# Patient Record
Sex: MALE | Race: WHITE | NOT HISPANIC OR LATINO | Employment: FULL TIME | URBAN - NONMETROPOLITAN AREA
[De-identification: names, ages, dates, MRNs, and addresses within clinical notes are randomized per-mention and may not be internally consistent; named-entity substitution may affect disease eponyms.]

---

## 2023-10-10 ENCOUNTER — OFFICE VISIT (OUTPATIENT)
Dept: UROLOGY | Facility: CLINIC | Age: 58
End: 2023-10-10
Payer: COMMERCIAL

## 2023-10-10 ENCOUNTER — HOSPITAL ENCOUNTER (OUTPATIENT)
Dept: GENERAL RADIOLOGY | Facility: HOSPITAL | Age: 58
Discharge: HOME | End: 2023-10-10
Admitting: NURSE PRACTITIONER
Payer: COMMERCIAL

## 2023-10-10 VITALS
BODY MASS INDEX: 28.26 KG/M2 | DIASTOLIC BLOOD PRESSURE: 94 MMHG | WEIGHT: 197.4 LBS | HEART RATE: 98 BPM | SYSTOLIC BLOOD PRESSURE: 140 MMHG | HEIGHT: 70 IN

## 2023-10-10 DIAGNOSIS — R31.29 OTHER MICROSCOPIC HEMATURIA: ICD-10-CM

## 2023-10-10 DIAGNOSIS — R31.0 GROSS HEMATURIA: Primary | ICD-10-CM

## 2023-10-10 DIAGNOSIS — N40.0 BPH WITHOUT OBSTRUCTION/LOWER URINARY TRACT SYMPTOMS: ICD-10-CM

## 2023-10-10 DIAGNOSIS — N20.0 BILATERAL RENAL STONES: ICD-10-CM

## 2023-10-10 DIAGNOSIS — K58.1 IRRITABLE BOWEL SYNDROME WITH CONSTIPATION: ICD-10-CM

## 2023-10-10 DIAGNOSIS — Z87.442 HISTORY OF KIDNEY STONES: ICD-10-CM

## 2023-10-10 LAB
BILIRUB BLD-MCNC: ABNORMAL MG/DL
CLARITY, POC: CLEAR
COLOR UR: YELLOW
EXPIRATION DATE: ABNORMAL
GLUCOSE UR STRIP-MCNC: NEGATIVE MG/DL
KETONES UR QL: NEGATIVE
LEUKOCYTE EST, POC: ABNORMAL
Lab: ABNORMAL
NITRITE UR-MCNC: NEGATIVE MG/ML
PH UR: 5.5 [PH] (ref 5–8)
PROT UR STRIP-MCNC: ABNORMAL MG/DL
RBC # UR STRIP: ABNORMAL /UL
SP GR UR: 1.02 (ref 1–1.03)
UROBILINOGEN UR QL: NORMAL

## 2023-10-10 PROCEDURE — 99204 OFFICE O/P NEW MOD 45 MIN: CPT | Performed by: NURSE PRACTITIONER

## 2023-10-10 PROCEDURE — 87086 URINE CULTURE/COLONY COUNT: CPT | Performed by: NURSE PRACTITIONER

## 2023-10-10 PROCEDURE — 74018 RADEX ABDOMEN 1 VIEW: CPT

## 2023-10-10 PROCEDURE — 81003 URINALYSIS AUTO W/O SCOPE: CPT | Performed by: NURSE PRACTITIONER

## 2023-10-10 RX ORDER — VALSARTAN 160 MG/1
160 TABLET ORAL DAILY
COMMUNITY
Start: 2023-09-19

## 2023-10-10 RX ORDER — TAMSULOSIN HYDROCHLORIDE 0.4 MG/1
1 CAPSULE ORAL DAILY
Qty: 30 CAPSULE | Refills: 5 | Status: SHIPPED | OUTPATIENT
Start: 2023-10-10

## 2023-10-10 RX ORDER — SENNOSIDES A AND B 8.6 MG/1
2 TABLET, FILM COATED ORAL DAILY PRN
Qty: 30 TABLET | Refills: 3 | Status: SHIPPED | OUTPATIENT
Start: 2023-10-10

## 2023-10-10 RX ORDER — TAMSULOSIN HYDROCHLORIDE 0.4 MG/1
1 CAPSULE ORAL DAILY
Qty: 30 CAPSULE | Refills: 5 | Status: SHIPPED | OUTPATIENT
Start: 2023-10-10 | End: 2023-10-10 | Stop reason: SDUPTHER

## 2023-10-10 RX ORDER — POLYETHYLENE GLYCOL 3350 17 G/17G
17 POWDER, FOR SOLUTION ORAL DAILY
Qty: 30 EACH | Refills: 3 | Status: SHIPPED | OUTPATIENT
Start: 2023-10-10

## 2023-10-10 RX ORDER — ATORVASTATIN CALCIUM 40 MG/1
40 TABLET, FILM COATED ORAL DAILY
COMMUNITY
Start: 2023-09-19

## 2023-10-10 NOTE — PROGRESS NOTES
Chief Complaint  Blood in Urine (NEW PATIENT WITH GROSS HEMATURIA/BPH /BILATERAL KIDNEY STONES)    Subjective          Sudhakar Caputo presents to [facility] for BPH WITH LUTS  Blood in Urine  Irritative symptoms do not include frequency or urgency. Pertinent negatives include no abdominal pain, chills, dysuria, fever, flank pain, nausea, vomiting or weight loss.     Mr. Sudhakar Caputo is a pleasant 58-year-old male who presents to clinic today for evaluation as a new patient consult. Patient has been referred to clinic by PCP with concerns of gross hematuria. On initial evaluation in clinic today, he is no apparent discomfort. 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  He has a motorized lawnmower, he reports he only sees blood after he mows his lawn which is kind of in a steep slope. He however reports numerous other concerns including BPH with lower urinary tract symptoms complicated by urinary hesitancy, with constant feeling of incomplete bladder emptying. Patient reports a history of kidney stones. On initial evaluation in clinic today, his urinalysis does show trace leukocyte esterase. It is negative for nitrite. It is negative for gross but showed trace microscopic hematuria. IPSS score is 10 with a PVR of 0 mL.  He is unsure about any PSA results.    The patient states he noticed hematuria approximately 6 weeks ago after mowing. About a week later he again had hematuria after mowing. Approximately 2 weeks later after mowing he had increased hematuria. Each time, after urinating once or twice, the gross hematuria has resolved. Initially, the blood is bright red but does not contain clots. The last time he urinated it burned. He denies any urinary frequency. His stream is between strong and weak. When he drinks enough water, his stream is stronger. His bladder does not feel like it empties after urinating. He denies nocturia.    The  "patient has a history of nephrolithiasis. His last one was in 12/2022 and was approximately his fifth. He was at [facility] at the time. His last imaging was performed at that then. They did not mention any additional nephrolithiasis seen in imaging.  Today, he denies any lower back pain or abdominal pain.  He denies flank pain, he does not have any CVA tenderness.  He states Dr. Savage checked his prostate and it was normal. The patient is unaware of what his PSA values have been.    We both reviewed/discussed HIS KUB results today which are unremarkable with no dilation.  However shows bilateral nonobstructive stones with no stones noted in ureters.  GASTROINTESTINAL TRACT: Abundant /LARGE colonic stool BURDEN.  This is characterized by extreme amounts of constipation.  We spoke about the impact of this on bladder function.  We spoke about  its relationship to recurrent urinary tract infections, hematuria, abdominal pain, pelvic/bladder pressure, back pain, flank pain he is experiencing.     He denies any family history of bladder cancer, prostate cancer, or breast cancer.     He states he smokes cigars.    FINDINGS KUB 10/10/2023: 2 views of the abdomen Large stool burden   Calcifications projecting over the left kidney. No evidence of bowel obstruction    Active Ambulatory Problems     Diagnosis Date Noted    Gross hematuria 10/10/2023    Other microscopic hematuria 10/10/2023    History of kidney stones 10/10/2023    Bilateral renal stones 10/10/2023    BPH without obstruction/lower urinary tract symptoms 10/10/2023    Irritable bowel syndrome with constipation 10/10/2023     Resolved Ambulatory Problems     Diagnosis Date Noted    No Resolved Ambulatory Problems     No Additional Past Medical History      Objective   Vital Signs:   /94   Pulse 98   Ht 177.8 cm (70\")   Wt 89.5 kg (197 lb 6.4 oz)   BMI 28.32 kg/mý       ROS:   Review of Systems   Constitutional:  Positive for activity change and " appetite change. Negative for chills, diaphoresis, fatigue, fever, unexpected weight gain and unexpected weight loss.   HENT: Negative.  Negative for congestion and sinus pressure.    Eyes: Negative.  Negative for blurred vision, double vision, photophobia, pain, redness and visual disturbance.   Respiratory: Negative.  Negative for apnea, cough, chest tightness, shortness of breath and wheezing.    Cardiovascular: Negative.    Gastrointestinal: Negative.  Negative for abdominal distention, abdominal pain, constipation, diarrhea, nausea and vomiting.   Endocrine: Negative.  Negative for polydipsia, polyphagia and polyuria.   Genitourinary:  Positive for hematuria. Negative for decreased urine volume, difficulty urinating, discharge, dysuria, flank pain, frequency, genital sores, penile pain, penile swelling, scrotal swelling, testicular pain, urgency and urinary incontinence.   Musculoskeletal: Negative.  Negative for back pain.   Skin:  Positive for color change and dry skin. Negative for pallor, rash and wound.   Allergic/Immunologic: Negative.    Neurological: Negative.  Negative for dizziness, weakness, headache, memory problem and confusion.   Hematological: Negative.  Does not bruise/bleed easily.   Psychiatric/Behavioral:  Positive for sleep disturbance and stress. Negative for agitation, behavioral problems and decreased concentration. The patient is not nervous/anxious.         Physical Exam  Constitutional:       General: He is in acute distress.      Appearance: He is well-developed.   HENT:      Head: Normocephalic and atraumatic.      Right Ear: External ear normal.      Left Ear: External ear normal.   Eyes:      General:         Right eye: No discharge.         Left eye: No discharge.      Conjunctiva/sclera: Conjunctivae normal.      Pupils: Pupils are equal, round, and reactive to light.   Neck:      Thyroid: No thyromegaly.      Trachea: No tracheal deviation.   Cardiovascular:      Rate and  Rhythm: Normal rate and regular rhythm.      Heart sounds: No murmur heard.     No friction rub.   Pulmonary:      Effort: Pulmonary effort is normal. No respiratory distress.      Breath sounds: Normal breath sounds. No stridor.   Abdominal:      General: Bowel sounds are normal. There is distension.      Palpations: Abdomen is soft.      Tenderness: There is abdominal tenderness. There is no guarding.   Genitourinary:     Penis: Normal and uncircumcised. No tenderness or discharge.       Testes: Normal.      Rectum: Normal. Guaiac result negative.      Comments: GROSS HEMATURIA, MARILEE-DEFERRED PER PTS REQUEST  Musculoskeletal:         General: Tenderness present. No deformity. Normal range of motion.      Cervical back: Normal range of motion and neck supple.   Skin:     General: Skin is warm and dry.      Capillary Refill: Capillary refill takes less than 2 seconds.      Coloration: Skin is pale.   Neurological:      Mental Status: He is alert and oriented to person, place, and time.      Cranial Nerves: No cranial nerve deficit.      Motor: Weakness present.      Coordination: Coordination normal.   Psychiatric:         Behavior: Behavior normal.         Thought Content: Thought content normal.         Judgment: Judgment normal.        Result Review :     UA          10/10/2023    08:53   Urinalysis   Ketones, UA Negative    Leukocytes, UA Trace      Urine Culture          10/10/2023    08:53   Urine Culture   Urine Culture No growth             Assessment and Plan    Problem List Items Addressed This Visit       Gross hematuria - Primary  Unremarkable RPE.   If KUB negative, proceed with CT for definitive plan of care.  Schedule patient for cystoscopy procedure with Dr. Frances pending results.     Relevant Orders    XR abdomen kub (Completed)    Other microscopic hematuria  Unremarkable RPE.   If KUB negative, proceed with CT for definitive plan of care.  Schedule patient for cystoscopy procedure with   Juan Daniel pending results.     Relevant Medications    tamsulosin (FLOMAX) 0.4 MG capsule 24 hr capsule    Other Relevant Orders    POC Urinalysis Dipstick, Automated (Completed)    Urine Culture - Urine, Urine, Clean Catch    XR abdomen kub (Completed)    History of kidney stones  Unremarkable RPE.   If KUB negative, proceed with CT for definitive plan of care.  Schedule patient for cystoscopy procedure with Dr. Frances pending results.     Relevant Medications    tamsulosin (FLOMAX) 0.4 MG capsule 24 hr capsule    Other Relevant Orders    XR abdomen kub (Completed)    Bilateral renal stones    BPH without obstruction/lower urinary tract symptoms  Unremarkable RPE.   If KUB negative, proceed with CT for definitive plan of care.  Schedule patient for cystoscopy procedure with Dr. Frances pending results.     Relevant Medications    tamsulosin (FLOMAX) 0.4 MG capsule 24 hr capsule    Other Relevant Orders    XR abdomen kub (Completed)    Irritable bowel syndrome with constipation    Relevant Medications    polyethylene glycol (MiraLax) 17 g packet    senna (SENOKOT) 8.6 MG tablet    tamsulosin (FLOMAX) 0.4 MG capsule 24 hr capsule                ASSESSMENT        GROSS HEMATURIA BILATERAL Nephrolithiasis/ L>R FLANK PAIN /IBS -C  Mr. Sudhakar Caputo is a pleasant 58-year-old male with significant history of recurrent kidney stones, last kidney stone procedure in December 2022, BPH with LUTS, who presents to clinic today for evaluation with concerns of persistent hematuria ongoing for about 6 weeks.  Patient reports his episodes are intermittent most often consistent post riding  HIS lawnmower.  He describes his episodes as bright red blood, he denies any clots.  He reports dysuria, burning with urination at times, but denies episodes of frequency, urgency.  He denies urinary hesitancy or postvoid dribbling.  Reports his stream is intermittent, denies nocturia.  Urinalysis however showed trace leukocyte esterase, it is  negative for nitrites, it is negative for gross but show trace microscopic hematuria.  PVR is 0 mL, with an IPSS score of 0. HE HAD an unremarkable digital rectal exam-MARILEE by his PCP.  His most recent recent PSA is 0.09 done on 9/19 or 2023.  He denies any prior issues with prostatitis.    GROSS HEMATURIA: first we Discussed Microscopic VS Gross Hematuria with patient. He has been very symptomatic and has some gross hematuria LAST WEEK.  We discussed the possible causes such as infection in the bladder, kidney, or bladder discomfort, trauma, kidney stones, different kinds of cancers, vigorous exercise, viral illness, such as hepatitis a virus that causes liver disease and inflammation of the liver and sexual activity.    WE Discussed the fact that there is about a 96% chance of a negative workup with episodes of microscopic hematuria and with much greater in the face of Gross hematuria.  We discussed the fact that this is a non-cumulative test.  In other words if there is hematuria next year I would recommend continuing to work up the condition because of the fact that neoplasms may be small at the first workup and easily are missed.   We discussed the fact that if there is any history of chronic kidney disease or risk factors such as diabetes for contrast a noncontrasted study will be utilized.  We will initiate an   investigation.       BILATERAL Nephrolithiasis/ L>R FLANK PAIN : KUB today is remarkable for bilateral nonobstructing stones, largest being 8 mm on the left. TODAY, We discussed the presence of the bilateral nonobstructing stones. We have discussed the various parameters regarding spontaneous passage including the notion that a tiny 1-4 mm stone has a 95% high likelihood of spontaneous passage versus a larger stone of >8 mm like he has being caught up in the upper areas of the urinary tract. We also discussed the medical management of stone disease and the use of medical expulsive therapy in the form  of Flomax. This is used in an off label setting. I also talked about nonoperative management including ambulation and increasing fluids and hot tub as being an effective adjuncts in the treatment of a stone.    We discussed the presence of the stones. We discussed the various therapeutic options available including percutaneous nephrostolithotomy, lithotripsy,ESWL.  We discussed the risks of lithotripsy including the passage of stones,  the development of a large string of stones in the distal ureter known as Steinstrasse.  In the 3% incidence of that we will need to proceed with a ureteroscopy for obstructing fragments.  Extremely rare incidence of renal hematoma, and the significance of this. We discussed the absolute relative indicators for intervention including the presence of sepsis, and pain we cannot control as the primary need for urgent intervention.  We discussed placement of a stent if indicated and the management of the stent as well.      ESWL-the patient is a candidate for extracorporeal shockwave lithotripsy.  We discussed the type of stone.  And the complications associated with the procedure including but not limited to pain in the flank, hematoma, spontaneous renal hemorrhage, and adequate fragmentation of stones.  The need for passage of the stones.  The need for concomitant additional procedures in the range of 24% the risk of a distal fragment in the range of 3% requiring ureteroscopic.  Patient reports He is not desirous of any surgical intervention at this time.  We will continue to monitor       PLAN  Start tamsulosin 0.4 mg daily-BPH with LUTS/bilateral kidney stones    We will resend his urine for culture, due to dysuria, frequency, I will call him with results if any positive bacterial growth.    We discussed starting antibiotic therapy with Bactrim, if any positive bacteria     I recommend concomitant probiotics with treatment with antibiotics to protect the rectal reservoir including  over-the-counter yogurt preparations to karen oral pills containing the appropriate probiotics.     We discussed the use of both an upper and lower tract investigation.  I discussed the fact that an upper tract investigation includes a  CT scan with contrast being the gold standard to diagnose the small neoplasms-patient had prior CT in December-pending results.  Repeat KUB today is remarkable for bilateral nonobstructing stones.  Most likely the source of his hematuria.     We discussed other treatment options for HIS BACK/FLANK pain with patient encouraged to continue conservative therapy alternating NSAID/Tylenol as tolerated, Also including hot baths, showers, warm compresses to lower back as tolerated. Also encouraged walking, physical therapy, light exercises as tolerated    Rest is the most important treatment in the case of BACK/FLANK pain. Rest and physical therapy are enough to improve minor pain. Discussed to monitor his daily routine with prevention of flank pain by: At least Drinking 8 glasses of water per day, Limiting your alcohol consumption.  Having a healthy diet containing fruits, vegetables, and a lot of fluids, Practicing safe sex.  Also maintaining proper hygiene of your body as well as the environment.    Discussed a kidney stone prevention diet to include increasing p.o. fluid intake, to at least 1 to 2 L of water daily.  She is to avoid caffeine products such as cola, coffee, and to avoid soft or soda drinks.  She is to decrease her sodium consumption as in  Fast foods, cherry, salted nuts, canned foods, and smoked/cured foods. She is also to decrease her oxalate consumption, as in spinach, Sesar greens, and Rhubarb.  Also important is to decrease protein intake, as in red meats, peanut butter, and also avoid nuts.    We discussed  lower tract investigation consisting of a cystoscopy due to positive history of smoking x20+ years if hematuria persists-deferred per patient.    We Discussed  obtaining a CT urogram if his Gross / Microhematuria  Persists.     If the patient's hematuria work-up is negative, per AUA guidelines I would recommend a repeat urinalysis via the patient's primary care provider in 12 months.  If the repeat urinalysis is positive, the patient and I will then need to have a shared decision-making conversation regarding further work-up versus observation, given the low likelihood of identifying etiology in this situation.  If patient were to develop gross hematuria in the interim, the patient would need a total re-evaluation.                                     He will follow-up in clinic in 1 year,    He may return sooner if hematuria episodes worsen for kidney stone therapy.    Patient is agreeable plan of care.    Patient reports that he is not currently experiencing any symptoms of urinary incontinence.    BMI is >= 25 and <30. (Overweight) The following options were offered after discussion;: weight loss educational material (shared in after visit summary), exercise counseling/recommendations, and nutrition counseling/recommendations      RADIOLOGY (CT AND/OR KUB):    CT Abdomen and Pelvis: No results found for this or any previous visit.     CT Stone Protocol: No results found for this or any previous visit.     KUB: No results found for this or any previous visit.       LABS (3 MONTHS):    Office Visit on 10/10/2023   Component Date Value Ref Range Status    Color 10/10/2023 Yellow  Yellow, Straw, Dark Yellow, Ivy Final    Clarity, UA 10/10/2023 Clear  Clear Final    Specific Gravity  10/10/2023 1.025  1.005 - 1.030 Final    pH, Urine 10/10/2023 5.5  5.0 - 8.0 Final    Leukocytes 10/10/2023 Trace (A)  Negative Final    Nitrite, UA 10/10/2023 Negative  Negative Final    Protein, POC 10/10/2023 1+ (A)  Negative mg/dL Final    Glucose, UA 10/10/2023 Negative  Negative mg/dL Final    Ketones, UA 10/10/2023 Negative  Negative Final    Urobilinogen, UA 10/10/2023 Normal  Normal,  0.2 E.U./dL Final    Bilirubin 10/10/2023 Small (1+) (A)  Negative Final    Blood, UA 10/10/2023 Trace (A)  Negative Final    Lot Number 10/10/2023 [id number]   Final    Expiration Date 10/10/2023 10/25/2024   Final    Urine Culture 10/10/2023 No growth   Final        IPSS Questionnaire (AUA-7):  Over the past month.    1)  How often have you had a sensation of not emptying your bladder completely after you finish urinating?  0 - Not at all   2)  How often have you had to urinate again less than two hours after you finished urinating? 0 - Not at all   3)  How often have you found you stopped and started again several times when you urinated?  0 - Not at all   4) How difficult have you found it to postpone urination?  0 - Not at all   5) How often have you had a weak urinary stream?  0 - Not at all   6) How often have you had to push or strain to begin urination?  0 - Not at all   7) How many times did you most typically get up to urinate from the time you went to bed until the time you got up in the morning?  0 - None   Total Score:  0     Smoking Cessation Counseling:  Current some day smoker. less than 3 minutes spent counseling. Will try to cut down.  I advised patient to quit tobacco use and offered support.  I provided patient with tobacco cessation educational material printed in the patient's After Visit Summary.     Follow Up   Return in about 1 year (around 10/10/2024) for Next scheduled follow up, GROSS/MICRO HEMATURIA/RENAL STONES/, FOR  BPH W LUTS/PSA/IBS-C.    Patient was given instructions and counseling regarding his condition or for health maintenance advice. Please see specific information pulled into the AVS if appropriate.          This document has been electronically signed by   October 11, 2023 17:51 EDT      Dictated Utilizing Dragon Dictation: Part of this note may be an electronic transcription/translation of spoken language to printed text using the Dragon Dictation  System.      Transcribed from ambient dictation for Griselda Cheng-Akwa, APRN by Lacy Espinoza.  10/11/23   09:11 EDT    Patient or patient representative verbalized consent to the visit recording.  I have personally performed the services described in this document as transcribed by the above individual, and it is both accurate and complete.

## 2023-10-11 LAB — BACTERIA SPEC AEROBE CULT: NO GROWTH

## 2023-10-25 DIAGNOSIS — N20.0 LEFT RENAL STONE: ICD-10-CM

## 2023-10-25 DIAGNOSIS — R10.9 BILATERAL FLANK PAIN: ICD-10-CM

## 2023-10-25 DIAGNOSIS — R31.0 GROSS HEMATURIA: ICD-10-CM

## 2023-10-25 DIAGNOSIS — N20.0 BILATERAL RENAL STONES: Primary | ICD-10-CM

## 2023-10-25 RX ORDER — GENTAMICIN SULFATE 80 MG/100ML
80 INJECTION, SOLUTION INTRAVENOUS ONCE
OUTPATIENT
Start: 2023-10-25

## 2023-10-26 PROBLEM — N20.0 LEFT RENAL STONE: Status: ACTIVE | Noted: 2023-10-25

## 2023-10-26 PROBLEM — R10.9 BILATERAL FLANK PAIN: Status: ACTIVE | Noted: 2023-10-25

## 2023-11-01 DIAGNOSIS — N20.0 BILATERAL RENAL STONES: Primary | ICD-10-CM

## 2023-11-10 ENCOUNTER — HOSPITAL ENCOUNTER (OUTPATIENT)
Facility: HOSPITAL | Age: 58
Setting detail: HOSPITAL OUTPATIENT SURGERY
Discharge: HOME | End: 2023-11-10
Attending: UROLOGY | Admitting: UROLOGY
Payer: COMMERCIAL

## 2023-11-10 ENCOUNTER — ANESTHESIA (OUTPATIENT)
Dept: PERIOP | Facility: HOSPITAL | Age: 58
End: 2023-11-10
Payer: COMMERCIAL

## 2023-11-10 ENCOUNTER — ANESTHESIA EVENT (OUTPATIENT)
Dept: PERIOP | Facility: HOSPITAL | Age: 58
End: 2023-11-10
Payer: COMMERCIAL

## 2023-11-10 VITALS
WEIGHT: 198.8 LBS | HEIGHT: 70 IN | OXYGEN SATURATION: 94 % | HEART RATE: 62 BPM | SYSTOLIC BLOOD PRESSURE: 143 MMHG | BODY MASS INDEX: 28.46 KG/M2 | TEMPERATURE: 97.3 F | DIASTOLIC BLOOD PRESSURE: 90 MMHG | RESPIRATION RATE: 18 BRPM

## 2023-11-10 DIAGNOSIS — N20.0 LEFT RENAL STONE: ICD-10-CM

## 2023-11-10 DIAGNOSIS — R31.0 GROSS HEMATURIA: ICD-10-CM

## 2023-11-10 DIAGNOSIS — R10.9 BILATERAL FLANK PAIN: ICD-10-CM

## 2023-11-10 DIAGNOSIS — N20.0 BILATERAL RENAL STONES: ICD-10-CM

## 2023-11-10 PROCEDURE — 25010000002 GENTAMICIN PER 80 MG: Performed by: NURSE PRACTITIONER

## 2023-11-10 PROCEDURE — 25810000003 LACTATED RINGERS PER 1000 ML: Performed by: NURSE ANESTHETIST, CERTIFIED REGISTERED

## 2023-11-10 PROCEDURE — 25010000002 FENTANYL CITRATE (PF) 50 MCG/ML SOLUTION: Performed by: NURSE ANESTHETIST, CERTIFIED REGISTERED

## 2023-11-10 PROCEDURE — S0260 H&P FOR SURGERY: HCPCS | Performed by: UROLOGY

## 2023-11-10 PROCEDURE — 25010000002 PROPOFOL 200 MG/20ML EMULSION: Performed by: NURSE ANESTHETIST, CERTIFIED REGISTERED

## 2023-11-10 PROCEDURE — 25010000002 MIDAZOLAM PER 1 MG: Performed by: NURSE ANESTHETIST, CERTIFIED REGISTERED

## 2023-11-10 PROCEDURE — 25010000002 ONDANSETRON PER 1 MG: Performed by: NURSE ANESTHETIST, CERTIFIED REGISTERED

## 2023-11-10 PROCEDURE — 25810000003 LACTATED RINGERS PER 1000 ML: Performed by: ANESTHESIOLOGY

## 2023-11-10 PROCEDURE — 50590 FRAGMENTING OF KIDNEY STONE: CPT | Performed by: UROLOGY

## 2023-11-10 RX ORDER — SODIUM CHLORIDE, SODIUM LACTATE, POTASSIUM CHLORIDE, CALCIUM CHLORIDE 600; 310; 30; 20 MG/100ML; MG/100ML; MG/100ML; MG/100ML
100 INJECTION, SOLUTION INTRAVENOUS ONCE AS NEEDED
Status: DISCONTINUED | OUTPATIENT
Start: 2023-11-10 | End: 2023-11-10 | Stop reason: HOSPADM

## 2023-11-10 RX ORDER — HYDROCODONE BITARTRATE AND ACETAMINOPHEN 10; 325 MG/1; MG/1
1 TABLET ORAL EVERY 6 HOURS PRN
Qty: 12 TABLET | Refills: 0 | Status: SHIPPED | OUTPATIENT
Start: 2023-11-10

## 2023-11-10 RX ORDER — FENTANYL CITRATE 50 UG/ML
50 INJECTION, SOLUTION INTRAMUSCULAR; INTRAVENOUS
Status: DISCONTINUED | OUTPATIENT
Start: 2023-11-10 | End: 2023-11-10 | Stop reason: HOSPADM

## 2023-11-10 RX ORDER — MIDAZOLAM HYDROCHLORIDE 1 MG/ML
1 INJECTION INTRAMUSCULAR; INTRAVENOUS
Status: DISCONTINUED | OUTPATIENT
Start: 2023-11-10 | End: 2023-11-10 | Stop reason: HOSPADM

## 2023-11-10 RX ORDER — SODIUM CHLORIDE 9 MG/ML
40 INJECTION, SOLUTION INTRAVENOUS AS NEEDED
Status: DISCONTINUED | OUTPATIENT
Start: 2023-11-10 | End: 2023-11-10 | Stop reason: HOSPADM

## 2023-11-10 RX ORDER — SODIUM CHLORIDE 0.9 % (FLUSH) 0.9 %
10 SYRINGE (ML) INJECTION EVERY 12 HOURS SCHEDULED
Status: DISCONTINUED | OUTPATIENT
Start: 2023-11-10 | End: 2023-11-10 | Stop reason: HOSPADM

## 2023-11-10 RX ORDER — SODIUM CHLORIDE, SODIUM LACTATE, POTASSIUM CHLORIDE, CALCIUM CHLORIDE 600; 310; 30; 20 MG/100ML; MG/100ML; MG/100ML; MG/100ML
125 INJECTION, SOLUTION INTRAVENOUS ONCE
Status: COMPLETED | OUTPATIENT
Start: 2023-11-10 | End: 2023-11-10

## 2023-11-10 RX ORDER — LIDOCAINE HYDROCHLORIDE 20 MG/ML
INJECTION, SOLUTION EPIDURAL; INFILTRATION; INTRACAUDAL; PERINEURAL AS NEEDED
Status: DISCONTINUED | OUTPATIENT
Start: 2023-11-10 | End: 2023-11-10 | Stop reason: SURG

## 2023-11-10 RX ORDER — ONDANSETRON 2 MG/ML
4 INJECTION INTRAMUSCULAR; INTRAVENOUS AS NEEDED
Status: DISCONTINUED | OUTPATIENT
Start: 2023-11-10 | End: 2023-11-10 | Stop reason: HOSPADM

## 2023-11-10 RX ORDER — MIDAZOLAM HYDROCHLORIDE 1 MG/ML
INJECTION INTRAMUSCULAR; INTRAVENOUS AS NEEDED
Status: DISCONTINUED | OUTPATIENT
Start: 2023-11-10 | End: 2023-11-10 | Stop reason: SURG

## 2023-11-10 RX ORDER — IPRATROPIUM BROMIDE AND ALBUTEROL SULFATE 2.5; .5 MG/3ML; MG/3ML
3 SOLUTION RESPIRATORY (INHALATION) ONCE AS NEEDED
Status: DISCONTINUED | OUTPATIENT
Start: 2023-11-10 | End: 2023-11-10 | Stop reason: HOSPADM

## 2023-11-10 RX ORDER — SODIUM CHLORIDE 0.9 % (FLUSH) 0.9 %
10 SYRINGE (ML) INJECTION AS NEEDED
Status: DISCONTINUED | OUTPATIENT
Start: 2023-11-10 | End: 2023-11-10 | Stop reason: HOSPADM

## 2023-11-10 RX ORDER — PROPOFOL 10 MG/ML
INJECTION, EMULSION INTRAVENOUS AS NEEDED
Status: DISCONTINUED | OUTPATIENT
Start: 2023-11-10 | End: 2023-11-10 | Stop reason: SURG

## 2023-11-10 RX ORDER — FAMOTIDINE 10 MG/ML
INJECTION, SOLUTION INTRAVENOUS AS NEEDED
Status: DISCONTINUED | OUTPATIENT
Start: 2023-11-10 | End: 2023-11-10 | Stop reason: SURG

## 2023-11-10 RX ORDER — OXYCODONE HYDROCHLORIDE AND ACETAMINOPHEN 5; 325 MG/1; MG/1
1 TABLET ORAL ONCE AS NEEDED
Status: DISCONTINUED | OUTPATIENT
Start: 2023-11-10 | End: 2023-11-10 | Stop reason: HOSPADM

## 2023-11-10 RX ORDER — FENTANYL CITRATE 50 UG/ML
INJECTION, SOLUTION INTRAMUSCULAR; INTRAVENOUS AS NEEDED
Status: DISCONTINUED | OUTPATIENT
Start: 2023-11-10 | End: 2023-11-10 | Stop reason: SURG

## 2023-11-10 RX ORDER — MEPERIDINE HYDROCHLORIDE 25 MG/ML
12.5 INJECTION INTRAMUSCULAR; INTRAVENOUS; SUBCUTANEOUS
Status: DISCONTINUED | OUTPATIENT
Start: 2023-11-10 | End: 2023-11-10 | Stop reason: HOSPADM

## 2023-11-10 RX ORDER — GENTAMICIN SULFATE 80 MG/100ML
80 INJECTION, SOLUTION INTRAVENOUS ONCE
Status: COMPLETED | OUTPATIENT
Start: 2023-11-10 | End: 2023-11-10

## 2023-11-10 RX ORDER — EPHEDRINE SULFATE 5 MG/ML
INJECTION INTRAVENOUS AS NEEDED
Status: DISCONTINUED | OUTPATIENT
Start: 2023-11-10 | End: 2023-11-10 | Stop reason: SURG

## 2023-11-10 RX ORDER — SODIUM CHLORIDE, SODIUM LACTATE, POTASSIUM CHLORIDE, CALCIUM CHLORIDE 600; 310; 30; 20 MG/100ML; MG/100ML; MG/100ML; MG/100ML
INJECTION, SOLUTION INTRAVENOUS CONTINUOUS PRN
Status: DISCONTINUED | OUTPATIENT
Start: 2023-11-10 | End: 2023-11-10 | Stop reason: SURG

## 2023-11-10 RX ORDER — ONDANSETRON 2 MG/ML
INJECTION INTRAMUSCULAR; INTRAVENOUS AS NEEDED
Status: DISCONTINUED | OUTPATIENT
Start: 2023-11-10 | End: 2023-11-10 | Stop reason: SURG

## 2023-11-10 RX ORDER — KETOROLAC TROMETHAMINE 30 MG/ML
30 INJECTION, SOLUTION INTRAMUSCULAR; INTRAVENOUS EVERY 6 HOURS PRN
Status: DISCONTINUED | OUTPATIENT
Start: 2023-11-10 | End: 2023-11-10 | Stop reason: HOSPADM

## 2023-11-10 RX ADMIN — GENTAMICIN SULFATE 80 MG: 80 INJECTION, SOLUTION INTRAVENOUS at 12:29

## 2023-11-10 RX ADMIN — EPHEDRINE SULFATE 10 MG: 5 INJECTION INTRAVENOUS at 12:38

## 2023-11-10 RX ADMIN — EPHEDRINE SULFATE 10 MG: 5 INJECTION INTRAVENOUS at 12:36

## 2023-11-10 RX ADMIN — FAMOTIDINE 20 MG: 10 INJECTION, SOLUTION INTRAVENOUS at 12:29

## 2023-11-10 RX ADMIN — SODIUM CHLORIDE, POTASSIUM CHLORIDE, SODIUM LACTATE AND CALCIUM CHLORIDE: 600; 310; 30; 20 INJECTION, SOLUTION INTRAVENOUS at 12:29

## 2023-11-10 RX ADMIN — ONDANSETRON 4 MG: 2 INJECTION INTRAMUSCULAR; INTRAVENOUS at 12:37

## 2023-11-10 RX ADMIN — SODIUM CHLORIDE, POTASSIUM CHLORIDE, SODIUM LACTATE AND CALCIUM CHLORIDE 125 ML/HR: 600; 310; 30; 20 INJECTION, SOLUTION INTRAVENOUS at 11:16

## 2023-11-10 RX ADMIN — FENTANYL CITRATE 100 MCG: 50 INJECTION, SOLUTION INTRAMUSCULAR; INTRAVENOUS at 12:29

## 2023-11-10 RX ADMIN — LIDOCAINE HYDROCHLORIDE 60 MG: 20 INJECTION, SOLUTION EPIDURAL; INFILTRATION; INTRACAUDAL; PERINEURAL at 12:32

## 2023-11-10 RX ADMIN — MIDAZOLAM HYDROCHLORIDE 2 MG: 1 INJECTION, SOLUTION INTRAMUSCULAR; INTRAVENOUS at 12:29

## 2023-11-10 RX ADMIN — PROPOFOL 100 MG: 10 INJECTION, EMULSION INTRAVENOUS at 12:32

## 2023-11-10 RX ADMIN — EPHEDRINE SULFATE 10 MG: 5 INJECTION INTRAVENOUS at 12:34

## 2023-11-10 NOTE — ANESTHESIA PROCEDURE NOTES
Airway  Urgency: elective    Date/Time: 11/10/2023 12:32 PM  Airway not difficult    General Information and Staff    Patient location during procedure: OR  Anesthesiologist: Uche Lang DO  CRNA/CAA: Sue Garrison CRNA    Indications and Patient Condition  Indications for airway management: airway protection    Preoxygenated: yes  Mask difficulty assessment: 0 - not attempted    Final Airway Details  Final airway type: supraglottic airway      Successful airway: classic  Size 4     Number of attempts at approach: 1  Assessment: lips, teeth, and gum same as pre-op    Additional Comments  LMA placed with no trauma noted. Patient tolerated well. Good seal. Secured.

## 2023-11-10 NOTE — ANESTHESIA POSTPROCEDURE EVALUATION
Patient: Sudhakar Caputo    Procedure Summary       Date: 11/10/23 Room / Location:  [facility] OR 09 /  [facility] OR    Anesthesia Start: 1229 Anesthesia Stop: 1305    Procedure: EXTRACORPOREAL SHOCKWAVE LITHOTRIPSY (Left) Diagnosis:       Bilateral renal stones      Left renal stone      Bilateral flank pain      Gross hematuria      (Bilateral renal stones [N20.0])      (Left renal stone [N20.0])      (Bilateral flank pain [R10.9])      (Gross hematuria [R31.0])    Surgeons: Ramiro Frances MD Provider: Uche Lang DO    Anesthesia Type: general ASA Status: 2            Anesthesia Type: general    Vitals  Vitals Value Taken Time   /83 11/10/23 1339   Temp 97.4 °F (36.3 °C) 11/10/23 1309   Pulse 70 11/10/23 1340   Resp 15 11/10/23 1339   SpO2 91 % 11/10/23 1340   Vitals shown include unfiled device data.        Post Anesthesia Care and Evaluation    Patient location during evaluation: PHASE II  Patient participation: complete - patient participated  Level of consciousness: awake and alert  Pain score: 1  Pain management: adequate    Airway patency: patent  Anesthetic complications: No anesthetic complications  PONV Status: controlled  Cardiovascular status: acceptable  Respiratory status: acceptable  Hydration status: acceptable

## 2023-11-10 NOTE — OP NOTE
EXTRACORPOREAL SHOCKWAVE LITHOTRIPSY  Procedure Note    Sudhakar Caputo  11/10/2023    Pre-op Diagnosis:   Bilateral renal stones [N20.0]  Left renal stone [N20.0]  Bilateral flank pain [R10.9]  Gross hematuria [R31.0]    Post-op Diagnosis:     Post-Op Diagnosis Codes:     * Bilateral renal stones [N20.0]     * Left renal stone [N20.0]     * Bilateral flank pain [R10.9]     * Gross hematuria [R31.0]    Procedure/CPT® Codes:  Mr. Sudhakar Caputo is a pleasant 58-year-old male who presents to clinic today for evaluation as a new patient consult. Patient has been referred to clinic by PCP with concerns of gross hematuria. On initial evaluation in clinic today, he is no apparent discomfort. 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  He has a motorized lawnmower, he reports he only sees blood after he mows his lawn which is kind of in a steep slope. He however reports numerous other concerns including BPH with lower urinary tract symptoms complicated by urinary hesitancy, with constant feeling of incomplete bladder emptying. Patient reports a history of kidney stones. On initial evaluation in clinic today, his urinalysis does show trace leukocyte esterase. It is negative for nitrite. It is negative for gross but showed trace microscopic hematuria. IPSS score is 10 with a PVR of 0 mL.  He is unsure about any PSA results.     The patient states he noticed hematuria approximately 6 weeks ago after mowing. About a week later he again had hematuria after mowing. Approximately 2 weeks later after mowing he had increased hematuria. Each time, after urinating once or twice, the gross hematuria has resolved. Initially, the blood is bright red but does not contain clots. The last time he urinated it burned. He denies any urinary frequency. His stream is between strong and weak. When he drinks enough water, his stream is stronger. His bladder does not feel like it empties after urinating. He denies  nocturia.     The patient has a history of nephrolithiasis. His last one was in 12/2022 and was approximately his fifth. He was at [facility] at the time. His last imaging was performed at that then. They did not mention any additional nephrolithiasis seen in imaging.  Today, he denies any lower back pain or abdominal pain.  He denies flank pain, he does not have any CVA tenderness.  He states Dr. Savage checked his prostate and it was normal. The patient is unaware of what his PSA values have been.     We both reviewed/discussed HIS KUB results today which are unremarkable with no dilation.  However shows bilateral nonobstructive stones with no stones noted in ureters.  GASTROINTESTINAL TRACT: Abundant /LARGE colonic stool BURDEN.  This is characterized by extreme amounts of constipation.  We spoke about the impact of this on bladder function.  We spoke about  its relationship to recurrent urinary tract infections, hematuria, abdominal pain, pelvic/bladder pressure, back pain, flank pain he is experiencing.   He is here for lithotripsy.  ESWL-the patient is a candidate for extracorporeal shockwave lithotripsy.  We discussed the type of stone and the complications associated with the procedure including, but not limited to, pain in the flank, hematoma, spontaneous renal hemorrhage, inadequate fragmentation of stones, the need for passage of the stones, the need for concomitant additional procedures in the range of 24%, the risk of a distal fragment in the range of 3% requiring ureteroscopic removal, and the fact that sometimes a stent is indicated based on the size and the density of the stone as determined on the CAT scan.  Additionally, we discussed percutaneous nephrostolithotomy.  Including the mini PERC.  With its attendant risks of anesthesia bleeding infection and the fact that is a invasive procedure with the remote possibility of a nephrectomy.  We also discussed the use of ureteroscopy which is a  rigid or flexible instrument placed up into the kidney to break up stones with the laser beam and very likely a postop stent and a high likelihood of additional concomitant procedures.  Following an informed consent, he was brought to the operative suite and underwent induction of general endotracheal anesthetic.  The stone was localized at F2 and a total of 3000 shockwaves was administered without complication.  There was excellent fragmentation  He was awake and alert and returned to recovery room.         Procedure(s):  EXTRACORPOREAL SHOCKWAVE LITHOTRIPSY    Surgeon(s):  Ramiro Frances MD    Anesthesia: see anesthesia record    Staff:   Circulator: Wen Hurley RN  Scrub Person: Mahnaz Morin LPN; Sarita Holguin    Estimated Blood Loss: none  Urine Voided: * No values recorded between 11/10/2023 12:30 PM and 11/10/2023 12:59 PM *    Specimens:                None      Drains: None    Findings: Excellent fragmentation     Blood: N/A    Complications: None    Grafts and Implants: None    Ramiro Frances MD     Date: 11/10/2023  Time: 12:59 EST

## 2023-11-10 NOTE — ANESTHESIA PREPROCEDURE EVALUATION
Anesthesia Evaluation     Patient summary reviewed and Nursing notes reviewed   no history of anesthetic complications:   NPO Solid Status: > 8 hours  NPO Liquid Status: > 8 hours           Airway   Mallampati: II  TM distance: >3 FB  Neck ROM: full  No difficulty expected  Dental - normal exam     Pulmonary - negative pulmonary ROS and normal exam    breath sounds clear to auscultation  Cardiovascular - normal exam    Rhythm: regular  Rate: normal    (+) hypertension      Neuro/Psych- negative ROS  GI/Hepatic/Renal/Endo    (+) renal disease- stones    Musculoskeletal (-) negative ROS    Abdominal  - normal exam   Substance History - negative use     OB/GYN negative ob/gyn ROS         Other - negative ROS                   Anesthesia Plan    ASA 2     general     intravenous induction     Anesthetic plan, risks, benefits, and alternatives have been provided, discussed and informed consent has been obtained with: spouse/significant other and patient.  Pre-procedure education provided  Plan discussed with CRNA.    CODE STATUS:

## 2023-11-10 NOTE — H&P
Subjective   Sudhakar Caputo presents to [facility] for BPH WITH LUTS  Blood in Urine  Irritative symptoms do not include frequency or urgency. Pertinent negatives include no abdominal pain, chills, dysuria, fever, flank pain, nausea, vomiting or weight loss.      Mr. Sudhakar Caputo is a pleasant 58-year-old male who presents to clinic today for evaluation as a new patient consult. Patient has been referred to clinic by PCP with concerns of gross hematuria. On initial evaluation in clinic today, he is no apparent discomfort. 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  He has a motorized lawnmower, he reports he only sees blood after he mows his lawn which is kind of in a steep slope. He however reports numerous other concerns including BPH with lower urinary tract symptoms complicated by urinary hesitancy, with constant feeling of incomplete bladder emptying. Patient reports a history of kidney stones. On initial evaluation in clinic today, his urinalysis does show trace leukocyte esterase. It is negative for nitrite. It is negative for gross but showed trace microscopic hematuria. IPSS score is 10 with a PVR of 0 mL.  He is unsure about any PSA results.     The patient states he noticed hematuria approximately 6 weeks ago after mowing. About a week later he again had hematuria after mowing. Approximately 2 weeks later after mowing he had increased hematuria. Each time, after urinating once or twice, the gross hematuria has resolved. Initially, the blood is bright red but does not contain clots. The last time he urinated it burned. He denies any urinary frequency. His stream is between strong and weak. When he drinks enough water, his stream is stronger. His bladder does not feel like it empties after urinating. He denies nocturia.     The patient has a history of nephrolithiasis. His last one was in 12/2022 and was approximately his  "fifth. He was at [facility] at the time. His last imaging was performed at that then. They did not mention any additional nephrolithiasis seen in imaging.  Today, he denies any lower back pain or abdominal pain.  He denies flank pain, he does not have any CVA tenderness.  He states Dr. Savage checked his prostate and it was normal. The patient is unaware of what his PSA values have been.     We both reviewed/discussed HIS KUB results today which are unremarkable with no dilation.  However shows bilateral nonobstructive stones with no stones noted in ureters.  GASTROINTESTINAL TRACT: Abundant /LARGE colonic stool BURDEN.  This is characterized by extreme amounts of constipation.  We spoke about the impact of this on bladder function.  We spoke about  its relationship to recurrent urinary tract infections, hematuria, abdominal pain, pelvic/bladder pressure, back pain, flank pain he is experiencing.      He denies any family history of bladder cancer, prostate cancer, or breast cancer.      He states he smokes cigars.     FINDINGS KUB 10/10/2023: 2 views of the abdomen Large stool burden   Calcifications projecting over the left kidney. No evidence of bowel obstruction          Active Ambulatory Problems     Diagnosis Date Noted    Gross hematuria 10/10/2023    Other microscopic hematuria 10/10/2023    History of kidney stones 10/10/2023    Bilateral renal stones 10/10/2023    BPH without obstruction/lower urinary tract symptoms 10/10/2023    Irritable bowel syndrome with constipation 10/10/2023           Resolved Ambulatory Problems     Diagnosis Date Noted    No Resolved Ambulatory Problems      No Additional Past Medical History            Objective   Vital Signs:   /94   Pulse 98   Ht 177.8 cm (70\")   Wt 89.5 kg (197 lb 6.4 oz)   BMI 28.32 kg/m²        ROS:   Review of Systems   Constitutional:  Positive for activity change and appetite change. Negative for chills, diaphoresis, fatigue, fever, " unexpected weight gain and unexpected weight loss.   HENT: Negative.  Negative for congestion and sinus pressure.    Eyes: Negative.  Negative for blurred vision, double vision, photophobia, pain, redness and visual disturbance.   Respiratory: Negative.  Negative for apnea, cough, chest tightness, shortness of breath and wheezing.    Cardiovascular: Negative.    Gastrointestinal: Negative.  Negative for abdominal distention, abdominal pain, constipation, diarrhea, nausea and vomiting.   Endocrine: Negative.  Negative for polydipsia, polyphagia and polyuria.   Genitourinary:  Positive for hematuria. Negative for decreased urine volume, difficulty urinating, discharge, dysuria, flank pain, frequency, genital sores, penile pain, penile swelling, scrotal swelling, testicular pain, urgency and urinary incontinence.   Musculoskeletal: Negative.  Negative for back pain.   Skin:  Positive for color change and dry skin. Negative for pallor, rash and wound.   Allergic/Immunologic: Negative.    Neurological: Negative.  Negative for dizziness, weakness, headache, memory problem and confusion.   Hematological: Negative.  Does not bruise/bleed easily.   Psychiatric/Behavioral:  Positive for sleep disturbance and stress. Negative for agitation, behavioral problems and decreased concentration. The patient is not nervous/anxious.          Physical Exam  Constitutional:       General: He is in acute distress.      Appearance: He is well-developed.   HENT:      Head: Normocephalic and atraumatic.      Right Ear: External ear normal.      Left Ear: External ear normal.   Eyes:      General:         Right eye: No discharge.         Left eye: No discharge.      Conjunctiva/sclera: Conjunctivae normal.      Pupils: Pupils are equal, round, and reactive to light.   Neck:      Thyroid: No thyromegaly.      Trachea: No tracheal deviation.   Cardiovascular:      Rate and Rhythm: Normal rate and regular rhythm.      Heart sounds: No murmur  heard.     No friction rub.   Pulmonary:      Effort: Pulmonary effort is normal. No respiratory distress.      Breath sounds: Normal breath sounds. No stridor.   Abdominal:      General: Bowel sounds are normal. There is distension.      Palpations: Abdomen is soft.      Tenderness: There is abdominal tenderness. There is no guarding.   Genitourinary:     Penis: Normal and uncircumcised. No tenderness or discharge.       Testes: Normal.      Rectum: Normal. Guaiac result negative.      Comments: GROSS HEMATURIA, MARILEE-DEFERRED PER PTS REQUEST  Musculoskeletal:         General: Tenderness present. No deformity. Normal range of motion.      Cervical back: Normal range of motion and neck supple.   Skin:     General: Skin is warm and dry.      Capillary Refill: Capillary refill takes less than 2 seconds.      Coloration: Skin is pale.   Neurological:      Mental Status: He is alert and oriented to person, place, and time.      Cranial Nerves: No cranial nerve deficit.      Motor: Weakness present.      Coordination: Coordination normal.   Psychiatric:         Behavior: Behavior normal.         Thought Content: Thought content normal.         Judgment: Judgment normal.               Result Review   :      Urinalysis Results:   UA            10/10/2023    08:53   Urinalysis   Ketones, UA Negative    Leukocytes, UA Trace          Urine Culture Results:   Urine Culture            10/10/2023    08:53   Urine Culture   Urine Culture No growth          PSA Results:               Assessment and Plan    Problem List Items Addressed This Visit         Gross hematuria - Primary  Unremarkable RPE.   If KUB negative, proceed with CT for definitive plan of care.  Schedule patient for cystoscopy procedure with Dr. Frances pending results.      Relevant Orders     XR abdomen kub (Completed)     Other microscopic hematuria  Unremarkable RPE.   If KUB negative, proceed with CT for definitive plan of care.  Schedule patient for cystoscopy  procedure with Dr. Frances pending results.      Relevant Medications     tamsulosin (FLOMAX) 0.4 MG capsule 24 hr capsule     Other Relevant Orders     POC Urinalysis Dipstick, Automated (Completed)     Urine Culture - Urine, Urine, Clean Catch     XR abdomen kub (Completed)     History of kidney stones  Unremarkable RPE.   If KUB negative, proceed with CT for definitive plan of care.  Schedule patient for cystoscopy procedure with Dr. Frances pending results.      Relevant Medications     tamsulosin (FLOMAX) 0.4 MG capsule 24 hr capsule     Other Relevant Orders     XR abdomen kub (Completed)     Bilateral renal stones     BPH without obstruction/lower urinary tract symptoms  Unremarkable RPE.   If KUB negative, proceed with CT for definitive plan of care.  Schedule patient for cystoscopy procedure with Dr. Frances pending results.      Relevant Medications     tamsulosin (FLOMAX) 0.4 MG capsule 24 hr capsule     Other Relevant Orders     XR abdomen kub (Completed)     Irritable bowel syndrome with constipation     Relevant Medications     polyethylene glycol (MiraLax) 17 g packet     senna (SENOKOT) 8.6 MG tablet     tamsulosin (FLOMAX) 0.4 MG capsule 24 hr capsule                                                  ASSESSMENT        GROSS HEMATURIA BILATERAL Nephrolithiasis/ L>R FLANK PAIN /IBS -C  Mr. Sudhakar Caputo is a pleasant 58-year-old male with significant history of recurrent kidney stones, last kidney stone procedure in December 2022, BPH with LUTS, who presents to clinic today for evaluation with concerns of persistent hematuria ongoing for about 6 weeks.  Patient reports his episodes are intermittent most often consistent post riding  HIS lawnmower.  He describes his episodes as bright red blood, he denies any clots.  He reports dysuria, burning with urination at times, but denies episodes of frequency, urgency.  He denies urinary hesitancy or postvoid dribbling.  Reports his stream is intermittent,  denies nocturia.  Urinalysis however showed trace leukocyte esterase, it is negative for nitrites, it is negative for gross but show trace microscopic hematuria.  PVR is 0 mL, with an IPSS score of 0. HE HAD an unremarkable digital rectal exam-MARILEE by his PCP.  His most recent recent PSA is 0.09 done on 9/19 or 2023.  He denies any prior issues with prostatitis.     GROSS HEMATURIA: first we Discussed Microscopic VS Gross Hematuria with patient. He has been very symptomatic and has some gross hematuria LAST WEEK.  We discussed the possible causes such as infection in the bladder, kidney, or bladder discomfort, trauma, kidney stones, different kinds of cancers, vigorous exercise, viral illness, such as hepatitis a virus that causes liver disease and inflammation of the liver and sexual activity.     WE Discussed the fact that there is about a 96% chance of a negative workup with episodes of microscopic hematuria and with much greater in the face of Gross hematuria.  We discussed the fact that this is a non-cumulative test.  In other words if there is hematuria next year I would recommend continuing to work up the condition because of the fact that neoplasms may be small at the first workup and easily are missed.   We discussed the fact that if there is any history of chronic kidney disease or risk factors such as diabetes for contrast a noncontrasted study will be utilized.  We will initiate an   investigation.       BILATERAL Nephrolithiasis/ L>R FLANK PAIN : KUB today is remarkable for bilateral nonobstructing stones, largest being 8 mm on the left. TODAY, We discussed the presence of the bilateral nonobstructing stones. We have discussed the various parameters regarding spontaneous passage including the notion that a tiny 1-4 mm stone has a 95% high likelihood of spontaneous passage versus a larger stone of >8 mm like he has being caught up in the upper areas of the urinary tract. We also discussed the medical  management of stone disease and the use of medical expulsive therapy in the form of Flomax. This is used in an off label setting. I also talked about nonoperative management including ambulation and increasing fluids and hot tub as being an effective adjuncts in the treatment of a stone.     We discussed the presence of the stones. We discussed the various therapeutic options available including percutaneous nephrostolithotomy, lithotripsy,ESWL.  We discussed the risks of lithotripsy including the passage of stones,  the development of a large string of stones in the distal ureter known as Steinstrasse.  In the 3% incidence of that we will need to proceed with a ureteroscopy for obstructing fragments.  Extremely rare incidence of renal hematoma, and the significance of this. We discussed the absolute relative indicators for intervention including the presence of sepsis, and pain we cannot control as the primary need for urgent intervention.  We discussed placement of a stent if indicated and the management of the stent as well.       ESWL-the patient is a candidate for extracorporeal shockwave lithotripsy.  We discussed the type of stone.  And the complications associated with the procedure including but not limited to pain in the flank, hematoma, spontaneous renal hemorrhage, and adequate fragmentation of stones.  The need for passage of the stones.  The need for concomitant additional procedures in the range of 24% the risk of a distal fragment in the range of 3% requiring ureteroscopic.  Patient reports He is not desirous of any surgical intervention at this time.  We will continue to monitor                                         PLAN  Start tamsulosin 0.4 mg daily-BPH with LUTS/bilateral kidney stones     We will resend his urine for culture, due to dysuria, frequency, I will call him with results if any positive bacterial growth.     We discussed starting antibiotic therapy with Bactrim, if any positive  bacteria      I recommend concomitant probiotics with treatment with antibiotics to protect the rectal reservoir including over-the-counter yogurt preparations to karen oral pills containing the appropriate probiotics.      We discussed the use of both an upper and lower tract investigation.  I discussed the fact that an upper tract investigation includes a  CT scan with contrast being the gold standard to diagnose the small neoplasms-patient had prior CT in December-pending results.  Repeat KUB today is remarkable for bilateral nonobstructing stones.  Most likely the source of his hematuria.      We discussed other treatment options for HIS BACK/FLANK pain with patient encouraged to continue conservative therapy alternating NSAID/Tylenol as tolerated, Also including hot baths, showers, warm compresses to lower back as tolerated. Also encouraged walking, physical therapy, light exercises as tolerated     Rest is the most important treatment in the case of BACK/FLANK pain. Rest and physical therapy are enough to improve minor pain. Discussed to monitor his daily routine with prevention of flank pain by: At least Drinking 8 glasses of water per day, Limiting your alcohol consumption.  Having a healthy diet containing fruits, vegetables, and a lot of fluids, Practicing safe sex.  Also maintaining proper hygiene of your body as well as the environment.     Discussed a kidney stone prevention diet to include increasing p.o. fluid intake, to at least 1 to 2 L of water daily.  She is to avoid caffeine products such as cola, coffee, and to avoid soft or soda drinks.  She is to decrease her sodium consumption as in  Fast foods, cherry, salted nuts, canned foods, and smoked/cured foods. She is also to decrease her oxalate consumption, as in spinach, Sesar greens, and Rhubarb.  Also important is to decrease protein intake, as in red meats, peanut butter, and also avoid nuts.     We discussed  lower tract investigation  consisting of a cystoscopy due to positive history of smoking x20+ years if hematuria persists-deferred per patient.     We Discussed obtaining a CT urogram if his Gross / Microhematuria  Persists.     If the patient's hematuria work-up is negative, per AUA guidelines I would recommend a repeat urinalysis via the patient's primary care provider in 12 months.  If the repeat urinalysis is positive, the patient and I will then need to have a shared decision-making conversation regarding further work-up versus observation, given the low likelihood of identifying etiology in this situation.  If patient were to develop gross hematuria in the interim, the patient would need a total re-evaluation.                                     He will follow-up in clinic in 1 year,     He may return sooner if hematuria episodes worsen for kidney stone therapy.     Patient is agreeable plan of care.     Patient reports that he is not currently experiencing any symptoms of urinary incontinence.     BMI is >= 25 and <30. (Overweight) The following options were offered after discussion;: weight loss educational material (shared in after visit summary), exercise counseling/recommendations, and nutrition counseling/recommendations        RADIOLOGY (CT AND/OR KUB):    CT Abdomen and Pelvis: No results found for this or any previous visit.     CT Stone Protocol: No results found for this or any previous visit.     KUB: No results found for this or any previous visit.        LABS (3 MONTHS):            Office Visit on 10/10/2023   Component Date Value Ref Range Status    Color 10/10/2023 Yellow  Yellow, Straw, Dark Yellow, Ivy Final    Clarity, UA 10/10/2023 Clear  Clear Final    Specific Gravity  10/10/2023 1.025  1.005 - 1.030 Final    pH, Urine 10/10/2023 5.5  5.0 - 8.0 Final    Leukocytes 10/10/2023 Trace (A)  Negative Final    Nitrite, UA 10/10/2023 Negative  Negative Final    Protein, POC 10/10/2023 1+ (A)  Negative mg/dL Final     Glucose, UA 10/10/2023 Negative  Negative mg/dL Final    Ketones, UA 10/10/2023 Negative  Negative Final    Urobilinogen, UA 10/10/2023 Normal  Normal, 0.2 E.U./dL Final    Bilirubin 10/10/2023 Small (1+) (A)  Negative Final    Blood, UA 10/10/2023 Trace (A)  Negative Final    Lot Number 10/10/2023 [id number]    Final    Expiration Date 10/10/2023 10/25/2024    Final    Urine Culture 10/10/2023 No growth    Final         IPSS Questionnaire (AUA-7):  Over the past month…     1)  How often have you had a sensation of not emptying your bladder completely after you finish urinating?  0 - Not at all   2)  How often have you had to urinate again less than two hours after you finished urinating? 0 - Not at all   3)  How often have you found you stopped and started again several times when you urinated?  0 - Not at all   4) How difficult have you found it to postpone urination?  0 - Not at all   5) How often have you had a weak urinary stream?  0 - Not at all   6) How often have you had to push or strain to begin urination?  0 - Not at all   7) How many times did you most typically get up to urinate from the time you went to bed until the time you got up in the morning?  0 - None   Total Score:  0      Smoking Cessation Counseling:  Current some day smoker. less than 3 minutes spent counseling. Will try to cut down.  I advised patient to quit tobacco use and offered support.  I provided patient with tobacco cessation educational material printed in the patient's After Visit Summary.      Follow Up   Return in about 1 year (around 10/10/2024) for Next scheduled follow up, GROSS/MICRO HEMATURIA/RENAL STONES/, FOR  BPH W LUTS/PSA/IBS-C.     Patient was given instructions and counseling regarding his condition or for health maintenance advice. Please see specific information pulled into the AVS if appropriate.   Juan Daniel

## 2023-11-27 ENCOUNTER — OFFICE VISIT (OUTPATIENT)
Dept: UROLOGY | Facility: CLINIC | Age: 58
End: 2023-11-27
Payer: COMMERCIAL

## 2023-11-27 ENCOUNTER — HOSPITAL ENCOUNTER (OUTPATIENT)
Dept: GENERAL RADIOLOGY | Facility: HOSPITAL | Age: 58
Discharge: HOME | End: 2023-11-27
Admitting: UROLOGY
Payer: COMMERCIAL

## 2023-11-27 VITALS
WEIGHT: 204 LBS | HEIGHT: 70 IN | SYSTOLIC BLOOD PRESSURE: 154 MMHG | BODY MASS INDEX: 29.2 KG/M2 | HEART RATE: 90 BPM | DIASTOLIC BLOOD PRESSURE: 84 MMHG

## 2023-11-27 DIAGNOSIS — N20.0 LEFT RENAL STONE: Primary | ICD-10-CM

## 2023-11-27 DIAGNOSIS — N20.0 BILATERAL RENAL STONES: ICD-10-CM

## 2023-11-27 PROCEDURE — 74018 RADEX ABDOMEN 1 VIEW: CPT

## 2023-11-27 PROCEDURE — 74018 RADEX ABDOMEN 1 VIEW: CPT | Performed by: RADIOLOGY

## 2023-11-27 PROCEDURE — 99024 POSTOP FOLLOW-UP VISIT: CPT | Performed by: UROLOGY

## 2023-11-27 NOTE — PROGRESS NOTES
Chief Complaint:      Chief Complaint   Patient presents with    Post-op     ESWL       HPI:   58 y.o. male returns today he feels great, no pain, no nausea, no vomiting, I reviewed his films there is a reduction in the stone volume but he still has the upper middle and lower pole calyceal stones.  Since he is pain-free I will give him 6 months we discussed alternative treatments which would include ureteroscopy.  He also drinks 4 Coca-Cola's a day.    Past Medical History:     Past Medical History:   Diagnosis Date    Elevated cholesterol     Hyperlipidemia     Hypertension     Kidney stones        Current Meds:     Current Outpatient Medications   Medication Sig Dispense Refill    atorvastatin (LIPITOR) 40 MG tablet Take 1 tablet by mouth Daily.      HYDROcodone-acetaminophen (NORCO)  MG per tablet Take 1 tablet by mouth Every 6 (Six) Hours As Needed for Moderate Pain (Pain). 12 tablet 0    polyethylene glycol (MiraLax) 17 g packet Take 17 g by mouth Daily. 30 each 3    senna (SENOKOT) 8.6 MG tablet Take 2 tablets by mouth Daily As Needed for Constipation. 30 tablet 3    tamsulosin (FLOMAX) 0.4 MG capsule 24 hr capsule Take 1 capsule by mouth Daily. 30 capsule 5    valsartan (DIOVAN) 160 MG tablet Take 1 tablet by mouth Daily.       No current facility-administered medications for this visit.        Allergies:      No Known Allergies     Past Surgical History:     Past Surgical History:   Procedure Laterality Date    APPENDECTOMY      COLONOSCOPY      ENDOSCOPY      EXTRACORPOREAL SHOCK WAVE LITHOTRIPSY (ESWL)      EXTRACORPOREAL SHOCK WAVE LITHOTRIPSY (ESWL) Left 11/10/2023    Procedure: EXTRACORPOREAL SHOCKWAVE LITHOTRIPSY;  Surgeon: Ramiro Frances MD;  Location: [facility];  Service: Urology;  Laterality: Left;       Social History:     Social History     Socioeconomic History    Marital status:    Tobacco Use    Smoking status: Never    Smokeless tobacco: Never   Vaping Use    Vaping Use:  Never used   Substance and Sexual Activity    Alcohol use: Never    Drug use: Never       Family History:     History reviewed. No pertinent family history.    Review of Systems:     Review of Systems   Constitutional: Negative.  Negative for chills, fatigue and fever.   HENT: Negative.     Eyes: Negative.    Respiratory: Negative.  Negative for cough, shortness of breath and wheezing.    Cardiovascular: Negative.    Gastrointestinal: Negative.  Negative for abdominal pain, nausea and vomiting.   Endocrine: Negative.    Genitourinary:  Negative for difficulty urinating, dysuria, frequency, penile discharge, penile swelling and scrotal swelling.   Musculoskeletal: Negative.  Negative for back pain and joint swelling.   Skin: Negative.    Allergic/Immunologic: Negative.    Neurological: Negative.  Negative for dizziness and headaches.   Hematological: Negative.    Psychiatric/Behavioral: Negative.  Negative for confusion.        Physical Exam:     Physical Exam  Vitals and nursing note reviewed.   Constitutional:       Appearance: He is well-developed.   HENT:      Head: Normocephalic and atraumatic.   Eyes:      Conjunctiva/sclera: Conjunctivae normal.      Pupils: Pupils are equal, round, and reactive to light.   Cardiovascular:      Rate and Rhythm: Normal rate and regular rhythm.      Heart sounds: Normal heart sounds.   Pulmonary:      Effort: Pulmonary effort is normal.      Breath sounds: Normal breath sounds.   Abdominal:      General: Bowel sounds are normal.      Palpations: Abdomen is soft.   Musculoskeletal:         General: Normal range of motion.      Cervical back: Normal range of motion.   Skin:     General: Skin is warm and dry.   Neurological:      Mental Status: He is alert and oriented to person, place, and time.      Deep Tendon Reflexes: Reflexes are normal and symmetric.   Psychiatric:         Behavior: Behavior normal.         Thought Content: Thought content normal.         Judgment: Judgment  normal.         I have reviewed the following portions of the patient's history: Allergies, current medications, past family history, past medical history, past social history, past surgical history, problem list, and ROS and confirm it is accurate.    Recent Image (CT and/or KUB):      CT Abdomen and Pelvis: No results found for this or any previous visit.       CT Stone Protocol: No results found for this or any previous visit.       KUB: Results for orders placed in visit on 10/10/23    XR abdomen kub    Narrative  EXAMINATION: XR ABDOMEN KUB-    CLINICAL INDICATION: hematuria/bph with luts; R31.29-Other microscopic  hematuria; R31.0-Gross hematuria; N40.0-Benign prostatic hyperplasia  without lower urinary tract symptoms; Z87.442-Personal history of  urinary calculi      COMPARISON: None available    FINDINGS:  2 views of the abdomen    Large stool burden    Calcifications projecting over the left kidney.    No evidence of bowel obstruction      This report was finalized on 10/10/2023 9:34 AM by Dr. Jesus Zayas MD.       Labs (past 3 months):      Office Visit on 10/10/2023   Component Date Value Ref Range Status    Color 10/10/2023 Yellow  Yellow, Straw, Dark Yellow, Ivy Final    Clarity, UA 10/10/2023 Clear  Clear Final    Specific Gravity  10/10/2023 1.025  1.005 - 1.030 Final    pH, Urine 10/10/2023 5.5  5.0 - 8.0 Final    Leukocytes 10/10/2023 Trace (A)  Negative Final    Nitrite, UA 10/10/2023 Negative  Negative Final    Protein, POC 10/10/2023 1+ (A)  Negative mg/dL Final    Glucose, UA 10/10/2023 Negative  Negative mg/dL Final    Ketones, UA 10/10/2023 Negative  Negative Final    Urobilinogen, UA 10/10/2023 Normal  Normal, 0.2 E.U./dL Final    Bilirubin 10/10/2023 Small (1+) (A)  Negative Final    Blood, UA 10/10/2023 Trace (A)  Negative Final    Lot Number 10/10/2023 [id number]   Final    Expiration Date 10/10/2023 10/25/2024   Final    Urine Culture 10/10/2023 No growth   Final         Procedure:       Assessment/Plan:   Renal calculus-we discussed the presence of the stone. We discussed the various therapeutic options available including percutaneous nephrostolithotomy, ureteroscopy and extracorporeal shockwave  lithotripsy.  We discussed the risks of lithotripsy including the passage of stones, the development of a large string of stones in the distal ureter known as Steinstrasse.  In the 3% incidence of that we will need to proceed with a ureteroscopy for obstructing fragments.  Extremely rare incidence of renal hematoma and the significance of this.  We discussed percutaneous nephrostolithotomy and its use as well as the risks and benefits such as the need for postoperative hospitalization, and the risk of damage to the kidney and the remote risk of a nephrectomy.  We also discussed the use of ureteroscopy in the upper tracts.  That this is as a decreased success rate to completely remove the stones on the first option and that very likely a stent will be required requiring an additional procedure for removal.  We discussed the absolute relative indicators for intervention including the presence of sepsis and pain we cannot control ais the primary need for urgent intervention.  We discussed placement of a stent if indicated and the management of the stent as well.  Status post lithotripsy successful insofar that he is now pain-free.  Recommended observation.  He certainly has some reduction but did not pass fragments.  We also discussed subsequent treatments with ureteroscopy.  Metabolic stone disease-discussed her stone analysis, discussed work-up including a 24-hour Litholink where indicated.  Discussed medical therapy including thiazide and Urocit-K that are specific to specific types of stones, discussed increasing fluids and avoidance of cola products and anything containing high amounts of oxalates.                  This document has been electronically signed by LEONOR BAKER  MD November 27, 2023 12:34 EST    Dictated Utilizing Dragon Dictation: Part of this note may be an electronic transcription/translation of spoken language to printed text using the Dragon Dictation System.